# Patient Record
Sex: MALE | Race: OTHER | HISPANIC OR LATINO | URBAN - METROPOLITAN AREA
[De-identification: names, ages, dates, MRNs, and addresses within clinical notes are randomized per-mention and may not be internally consistent; named-entity substitution may affect disease eponyms.]

---
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
## 2022-01-01 NOTE — ED ADULT NURSE NOTE - NSFALLRSKUNASSIST_ED_ALL_ED
Cooling initiated 12/7 at 23:25   Rewarming 12/10-12/11 finished at 0600  EEG in place with no seizure activity - EEG to be disconnected today   labs per protocol no

## 2022-09-28 NOTE — ED PROVIDER NOTE - PLAN OF CARE
0 1. Return to ED for worsening, progressive or any other concerning symptoms   2. Follow up with your primary care doctor in 2-3days   3. Follow up with Dr Barreto, Urology

## 2024-06-15 ENCOUNTER — EMERGENCY (EMERGENCY)
Facility: HOSPITAL | Age: 24
LOS: 1 days | End: 2024-06-15
Attending: EMERGENCY MEDICINE
Payer: COMMERCIAL

## 2024-06-15 VITALS
DIASTOLIC BLOOD PRESSURE: 80 MMHG | RESPIRATION RATE: 18 BRPM | HEART RATE: 78 BPM | SYSTOLIC BLOOD PRESSURE: 130 MMHG | WEIGHT: 165.79 LBS | TEMPERATURE: 98 F | OXYGEN SATURATION: 98 %

## 2024-06-15 PROCEDURE — 73600 X-RAY EXAM OF ANKLE: CPT | Mod: 26,LT

## 2024-06-15 PROCEDURE — 73590 X-RAY EXAM OF LOWER LEG: CPT | Mod: 26,LT

## 2024-06-15 PROCEDURE — 99284 EMERGENCY DEPT VISIT MOD MDM: CPT

## 2024-06-15 PROCEDURE — 73590 X-RAY EXAM OF LOWER LEG: CPT

## 2024-06-15 PROCEDURE — 73630 X-RAY EXAM OF FOOT: CPT

## 2024-06-15 PROCEDURE — 73630 X-RAY EXAM OF FOOT: CPT | Mod: 26,LT

## 2024-06-15 PROCEDURE — 73600 X-RAY EXAM OF ANKLE: CPT

## 2024-06-15 RX ORDER — IBUPROFEN 200 MG
600 TABLET ORAL ONCE
Refills: 0 | Status: COMPLETED | OUTPATIENT
Start: 2024-06-15 | End: 2024-06-15

## 2024-06-15 RX ADMIN — Medication 600 MILLIGRAM(S): at 22:57

## 2024-06-15 NOTE — ED PROVIDER NOTE - ATTENDING APP SHARED VISIT CONTRIBUTION OF CARE
Llateral ankle swelling   indep eval   s/p hiking  pain laterally   pe + ttp  - mild  plan imaging  agree w dale and mngt

## 2024-06-15 NOTE — ED PROVIDER NOTE - CLINICAL SUMMARY MEDICAL DECISION MAKING FREE TEXT BOX
22 yo male with no pmhx presents with left ankle pain since this morning after eversion injury. neurovascularly intact, no fnd's   compartments soft/compressible. fwb and ambulating. 24 yo male with no pmhx presents with left ankle pain since this morning after eversion injury. neurovascularly intact, no fnd's   compartments soft/compressible. fwb and ambulating.  xrays negative for acute pathology. RICE tx explained. strict return precautions explained

## 2024-06-15 NOTE — ED PROVIDER NOTE - CARE PROVIDER_API CALL
Sha Gross  Orthopaedic Surgery  [address] [place] [address]  Phone: [phone]  Fax: [phone]  Follow Up Time:     Santo Delgado  Orthopaedic Surgery  [address] [place] [id number]  Phone: [phone]  Fax: [phone]  Follow Up Time:

## 2024-06-15 NOTE — ED PROVIDER NOTE - PATIENT PORTAL LINK FT
You can access the FollowMyHealth Patient Portal offered by [facility] by registering at the following website: [email or web address]. By joining [facility]’s FollowMyHealth portal, you will also be able to view your health information using other applications (apps) compatible with our system.

## 2024-06-15 NOTE — ED PROVIDER NOTE - PHYSICAL EXAMINATION
Gen: No acute distress, non toxic  HEENT: Mucous membranes moist, pink conjunctivae, EOMI. PERRL   CV: RRR, nl s1/s2.  Resp: CTAB, normal rate and effort  Neuro: A&O x4, MAEx4. 5/5 str ext x 4. Sensation intact, symmetric throughout. No fnd's.  MSK: No spinal ttp. +ttp to left lateral malleolus. no gross deformity of extremity, FROM LE b/l, no fibula head/ prox fibula ttp, no ttp to base 5th MT, compartments soft/compressible. FWB and ambulating.  Skin: No rashes. intact and perfused. cap refill <2sec  Vascular: Radial and dorsalis pedal pulses 2+ b/l.

## 2024-06-15 NOTE — ED ADULT NURSE NOTE - OBJECTIVE STATEMENT
PT stated that he thinks he sprained his ankle while hiking around 8AM today.  pt stated that took Tylenol prior to arrival. PT stated that he thinks he sprained his left ankle while hiking around 8AM today.  pt stated that took Tylenol prior to arrival.

## 2024-06-15 NOTE — ED PROVIDER NOTE - OBJECTIVE STATEMENT
24 yo male with no pmhx presents with left ankle pain since this morning. States he went hiking this morning and when he was walking the left ankle everted. States that after he everted it, walked on it for another 2 hrs. Now experiencing some discomfort in the left ankle. Took tylenol 1000mg for pain. Denies any other acute injuries. Denies fever, dizziness, LOC, vision changes, cp, palpitations, sob, abd pain, n/v/c/d, dysuria, hematuria, paresthesias in the extremities, coolness/changes in colors to the extremities, rashes.

## 2024-06-15 NOTE — ED PROVIDER NOTE - ED STEMI HIDDEN
CC:  Ileana Lyman is here today for MRI results of pelvis.    Medications: medications verified, no change  Refills needed today?  NO  Patient would like communication of their results via:  "[employer]    Cell Phone:   Telephone Information:   Mobile [phone]     Okay to leave a message containing results? Yes       If your visit includes an exam today, would you like an assistant to be present in the room during that time? NO       hide

## 2024-06-15 NOTE — ED PROVIDER NOTE - CARE PROVIDERS DIRECT ADDRESSES
[email or web address]

## 2024-06-15 NOTE — ED PROVIDER NOTE - NSFOLLOWUPINSTRUCTIONS_ED_ALL_ED_FT
- Please follow-up with your primary care doctor in the next 1-2 days.  Please call tomorrow for an appointment.  If you cannot follow-up with your primary care doctor please return to the ED for any urgent issues.  - You were given a copy of the tests performed today.  Please bring the results with you and review them with your primary care doctor.  - Take tylenol every 4 hours or motrin every 6 hours as needed for pain   - If you have any worsening of symptoms or any other concerns please return to the ED immediately.  - Follow up with the orthopedist within 7-10 days.

## 2024-07-05 ENCOUNTER — NON-APPOINTMENT (OUTPATIENT)
Age: 24
End: 2024-07-05

## 2024-07-05 ENCOUNTER — APPOINTMENT (OUTPATIENT)
Age: 24
End: 2024-07-05
Payer: COMMERCIAL

## 2024-07-05 VITALS — HEIGHT: 70 IN | BODY MASS INDEX: 25.05 KG/M2 | WEIGHT: 175 LBS

## 2024-07-05 DIAGNOSIS — S93.402A SPRAIN OF UNSPECIFIED LIGAMENT OF LEFT ANKLE, INITIAL ENCOUNTER: ICD-10-CM

## 2024-07-05 DIAGNOSIS — M25.572 PAIN IN LEFT ANKLE AND JOINTS OF LEFT FOOT: ICD-10-CM

## 2024-07-05 PROCEDURE — 99203 OFFICE O/P NEW LOW 30 MIN: CPT
